# Patient Record
Sex: FEMALE | Race: WHITE
[De-identification: names, ages, dates, MRNs, and addresses within clinical notes are randomized per-mention and may not be internally consistent; named-entity substitution may affect disease eponyms.]

---

## 2017-05-07 ENCOUNTER — HOSPITAL ENCOUNTER (EMERGENCY)
Dept: HOSPITAL 61 - ER | Age: 27
Discharge: HOME | End: 2017-05-07
Payer: MEDICAID

## 2017-05-07 VITALS — BODY MASS INDEX: 23.9 KG/M2 | HEIGHT: 64 IN | WEIGHT: 140 LBS

## 2017-05-07 VITALS — DIASTOLIC BLOOD PRESSURE: 75 MMHG | SYSTOLIC BLOOD PRESSURE: 125 MMHG

## 2017-05-07 DIAGNOSIS — J34.0: Primary | ICD-10-CM

## 2017-05-07 PROCEDURE — 99283 EMERGENCY DEPT VISIT LOW MDM: CPT

## 2017-05-07 NOTE — PHYS DOC
Adult General


Chief Complaint


Chief Complaint:  ABSCESS





HPI


HPI





Patient is a 27  year old female with no significant medical history who 

presents with an abscess on the left nostril that began 3 days ago. Patient 

states she had a pimple in the area and she picked on it and now the area is 

swollen and red. Patient denies any fever.





Review of Systems


Review of Systems





Constitutional: Denies fever or chills []


Eyes: Denies change in visual acuity, redness, or eye pain []


HENT: Denies nasal congestion or sore throat []


: Denies dysuria or hematuria []


Musculoskeletal: Denies back pain or joint pain []


Integument: Left nostril abscess


Neurologic: Denies headache, focal weakness or sensory changes []


Endocrine: Denies polyuria or polydipsia []





Physical Exam


Physical Exam





Constitutional: Well developed, well nourished, no acute distress, non-toxic 

appearance. []


HENT: Normocephalic, atraumatic, bilateral external ears normal, oropharynx 

moist, no oral exudates, nose normal. []


Eyes: PERRLA, EOMI, conjunctiva normal, no discharge. [] 


Skin: Left lower medial nostril with an open wound approximately 0.5 x 0.5 with 

surrounding 1 cm cellulitis. The area is warm and tender to palpate but not 

fluctuant. There is no drainage from the area.


Back: No tenderness, no CVA tenderness. [] 


Extremities: No tenderness, no cyanosis, no clubbing, ROM intact, no edema. [] 


Neurologic: Alert and oriented X 3, normal motor function, normal sensory 

function, no focal deficits noted. []


Psychologic: Affect normal, judgement normal, mood normal. []





EKG


EKG


[]





Radiology/Procedures


Radiology/Procedures


[]





Course & Med Decision Making


Course & Med Decision Making


Pertinent Labs and Imaging studies reviewed. (See chart for details)





Patient has an abscess on the lower nostril that began as a pimple. There is 

nothing to drain from the area. Tetanus is up-to-date. Discharged Bactrim for 

10 days. Instructed to keep the area clean and dry. Follow-up with PCP in 1-2 

weeks as needed.





Dragon Disclaimer


Dragon Disclaimer


This electronic medical record was generated, in whole or in part, using a 

voice recognition dictation system.





Departure


Departure


Impression:  


 Primary Impression:  


 Abscess of nose


Disposition:  01 HOME, SELF-CARE


Condition:  STABLE


Patient Instructions:  Abscess





Additional Instructions:  


You were seen for an abscess of the left nostril. Keep the area clean and dry. 

You can apply warm compresses to the area twice a day. Take the prescribed 

antibiotics as ordered and complete them. Follow-up with your doctor in 1-2 

weeks.


Scripts


Sulfamethoxazole/Trimethoprim (BACTRIM DS TABLET) 1 Each Tablet


1 TAB PO BID, #20 TAB


   Prov: CLAIRE REEYS         5/7/17











CLAIRE REYES May 7, 2017 10:46

## 2017-06-21 ENCOUNTER — HOSPITAL ENCOUNTER (EMERGENCY)
Dept: HOSPITAL 61 - ER | Age: 27
Discharge: HOME | End: 2017-06-21
Payer: MEDICAID

## 2017-06-21 VITALS — WEIGHT: 146 LBS | HEIGHT: 65 IN | BODY MASS INDEX: 24.32 KG/M2

## 2017-06-21 VITALS — DIASTOLIC BLOOD PRESSURE: 55 MMHG | SYSTOLIC BLOOD PRESSURE: 111 MMHG

## 2017-06-21 DIAGNOSIS — J02.9: Primary | ICD-10-CM

## 2017-06-21 PROCEDURE — 87070 CULTURE OTHR SPECIMN AEROBIC: CPT

## 2017-06-21 PROCEDURE — 87880 STREP A ASSAY W/OPTIC: CPT

## 2017-06-21 PROCEDURE — 99283 EMERGENCY DEPT VISIT LOW MDM: CPT

## 2017-06-21 NOTE — PHYS DOC
Past Medical History


Past Medical History:  No Pertinent History


Past Surgical History:  Other


Additional Past Surgical Histo:  Bilateral inguinal hernia.


Alcohol Use:  Occasionally


Drug Use:  None





Adult General


Chief Complaint


Chief Complaint:  SORE THROAT





HPI


HPI





Patient is a 27  year old female presents to the emergency department stating 

that a few days ago she was on the brake she ate and oriented in which she fell 

Contact. Patient states she presents to the emergency department feeling as 

though there is something still stuck in the left side of her throat which is 

causing her to have some pain and discomfort up into her left ear. She denies 

any fever, chills or any nausea or vomiting. She does state she's been having 

problems with allergies in which she's been taken Zyrtec for.





Review of Systems


Review of Systems





Constitutional: Denies fever or chills []


Eyes: Denies change in visual acuity, redness, or eye pain []


HENT: Denies nasal congestion C/o sore throat []


Respiratory: Denies cough or shortness of breath []


Cardiovascular: No additional information not addressed in HPI []


GI: Denies abdominal pain, nausea, vomiting, bloody stools or diarrhea []


: Denies dysuria or hematuria []


Musculoskeletal: Denies back pain or joint pain []


Integument: Denies rash or skin lesions []


Neurologic: Denies headache, focal weakness or sensory changes []


Endocrine: Denies polyuria or polydipsia []





Allergies


Allergies





Allergies








Coded Allergies Type Severity Reaction Last Updated Verified


 


  No Known Drug Allergies    6/21/17 No











Physical Exam


Physical Exam





Constitutional: Well developed, well nourished, no acute distress, non-toxic 

appearance. []


HENT: Normocephalic, atraumatic, bilateral external ears normal, oropharynx 

moist, no oral exudates, nose normal. Bilateral tympanic membranes appear to be 

normal although bulging. No frontal or maxillary sinus tenderness noted. 

Patient with redness noted in the throat although no erythematous no exudate 

noted. Patient with bilateral anterior cervical adenopathy noted.


Eyes: PERRLA, EOMI, conjunctiva normal, no discharge. [] 


Neck: Normal range of motion, no tenderness, supple, no stridor. [] 


Cardiovascular:Heart rate regular rhythm, no murmur []


Lungs & Thorax:  Bilateral breath sounds clear to auscultation []] 


Skin: Warm, dry, no erythema, no rash. [] 


Back: No tenderness


Extremities: No tenderness, no cyanosis, no clubbing, ROM intact, no edema. [] 


Neurologic: Alert and oriented X 3, normal motor function, normal sensory 

function, no focal deficits noted. []


Psychologic: Affect normal, judgement normal, mood normal. []





Current Patient Data


Vital Signs





 Vital Signs








  Date Time  Temp Pulse Resp B/P (MAP) Pulse Ox O2 Delivery O2 Flow Rate FiO2


 


6/21/17 18:08 98.5 84 16  100 Room Air  





 98.5       











EKG


EKG


[]





Radiology/Procedures


Radiology/Procedures


[]





Course & Med Decision Making


Course & Med Decision Making


Pertinent Labs and Imaging studies reviewed. (See chart for details)


Recommended patient to continue using her Zyrtec at home. Also recommended 

plenty of fluids. Tylenol or ibuprofen for fever chills or generalized body 

aches and discomfort. Patient will be discharged home in stable condition. 

Signs and symptoms to return back to emergency department as been provided. 

Patient agrees with discharge instructions treatment regimens and follow-up 

recommendations.


[]





Dragon Disclaimer


Dragon Disclaimer


This electronic medical record was generated, in whole or in part, using a 

voice recognition dictation system.





Departure


Departure


Impression:  


 Primary Impression:  


 Pharyngitis


Disposition:  01 HOME, SELF-CARE


Condition:  STABLE


Referrals:  


NO PCP (PCP)


Patient Instructions:  Viral and Bacterial Pharyngitis, Easy-to-Read





Additional Instructions:  


Rapid strep was negative.


Activity as tolerated.


Continue using her Zyrtec at home as prescribed by  over-the-

counter.


Drink plenty of fluids.


Tylenol or ibuprofen for fever chills or generalized body aches and discomfort.


Follow-up to primary care physician next 5-7 days.


Return back to emergency percent symptoms of become worse.











DEIDRE GOSS APRN Jun 21, 2017 18:55

## 2017-06-22 LAB
NEGATIVE OBC STREP: (no result)
POSITIVE OBC STREP: (no result)